# Patient Record
Sex: FEMALE | Race: WHITE
[De-identification: names, ages, dates, MRNs, and addresses within clinical notes are randomized per-mention and may not be internally consistent; named-entity substitution may affect disease eponyms.]

---

## 2021-05-10 ENCOUNTER — HOSPITAL ENCOUNTER (EMERGENCY)
Dept: HOSPITAL 8 - ED | Age: 46
Discharge: HOME | End: 2021-05-10
Payer: MEDICAID

## 2021-05-10 VITALS — HEIGHT: 62 IN | WEIGHT: 136.69 LBS | BODY MASS INDEX: 25.15 KG/M2

## 2021-05-10 VITALS — SYSTOLIC BLOOD PRESSURE: 162 MMHG | DIASTOLIC BLOOD PRESSURE: 108 MMHG

## 2021-05-10 DIAGNOSIS — E11.9: ICD-10-CM

## 2021-05-10 DIAGNOSIS — I10: ICD-10-CM

## 2021-05-10 DIAGNOSIS — R07.89: Primary | ICD-10-CM

## 2021-05-10 DIAGNOSIS — R51.9: ICD-10-CM

## 2021-05-10 DIAGNOSIS — F15.10: ICD-10-CM

## 2021-05-10 DIAGNOSIS — Z98.61: ICD-10-CM

## 2021-05-10 DIAGNOSIS — R00.2: ICD-10-CM

## 2021-05-10 LAB
ALBUMIN SERPL-MCNC: 3.7 G/DL (ref 3.4–5)
ANION GAP SERPL CALC-SCNC: 6 MMOL/L (ref 5–15)
BASOPHILS # BLD AUTO: 0.1 X10^3/UL (ref 0–0.1)
BASOPHILS NFR BLD AUTO: 1 % (ref 0–1)
CALCIUM SERPL-MCNC: 9.2 MG/DL (ref 8.5–10.1)
CHLORIDE SERPL-SCNC: 102 MMOL/L (ref 98–107)
CREAT SERPL-MCNC: 0.7 MG/DL (ref 0.55–1.02)
EOSINOPHIL # BLD AUTO: 0.1 X10^3/UL (ref 0–0.4)
EOSINOPHIL NFR BLD AUTO: 2 % (ref 1–7)
ERYTHROCYTE [DISTWIDTH] IN BLOOD BY AUTOMATED COUNT: 13.2 % (ref 9.6–15.2)
LYMPHOCYTES # BLD AUTO: 2.7 X10^3/UL (ref 1–3.4)
LYMPHOCYTES NFR BLD AUTO: 40 % (ref 22–44)
MCH RBC QN AUTO: 27.9 PG (ref 27–34.8)
MCHC RBC AUTO-ENTMCNC: 33.6 G/DL (ref 32.4–35.8)
MD: NO
MONOCYTES # BLD AUTO: 0.5 X10^3/UL (ref 0.2–0.8)
MONOCYTES NFR BLD AUTO: 8 % (ref 2–9)
NEUTROPHILS # BLD AUTO: 3.3 X10^3/UL (ref 1.8–6.8)
NEUTROPHILS NFR BLD AUTO: 49 % (ref 42–75)
PLATELET # BLD AUTO: 216 X10^3/UL (ref 130–400)
PMV BLD AUTO: 8.2 FL (ref 7.4–10.4)
RBC # BLD AUTO: 4.44 X10^6/UL (ref 3.82–5.3)

## 2021-05-10 PROCEDURE — 99284 EMERGENCY DEPT VISIT MOD MDM: CPT

## 2021-05-10 PROCEDURE — 85025 COMPLETE CBC W/AUTO DIFF WBC: CPT

## 2021-05-10 PROCEDURE — 80048 BASIC METABOLIC PNL TOTAL CA: CPT

## 2021-05-10 PROCEDURE — 93005 ELECTROCARDIOGRAM TRACING: CPT

## 2021-05-10 PROCEDURE — 82040 ASSAY OF SERUM ALBUMIN: CPT

## 2021-05-10 PROCEDURE — 36415 COLL VENOUS BLD VENIPUNCTURE: CPT

## 2021-05-10 PROCEDURE — 84443 ASSAY THYROID STIM HORMONE: CPT

## 2021-05-10 NOTE — NUR
PT STATES REFUSING CXR BECAUSE SHE DOESN'T THINK THAT SHE NEEDS IT. PT STATES 
"I THINK THIS IS BECAUSE OF MY ANXIETY, I DON'T THINK IM HAVING A HEART 
ATTACK". PT EDUCATED ON ED PROCESS AND ERP RECOMMENDATIONS. PT AGREED TO STAY 
FOR LAB RESULTS BUT THEN WOULD LIKE TO LEAVE BECAUSE SHE IS WORRIED SHE WILL 
LOOSE HER BED.

## 2021-05-10 NOTE — NUR
REPORT RECIEVED FROM NAKUL DAIGLE. PT ANXIOUS ABOUT WANTING TO LEAVE, DR. FISHER 
SPOKE WITH PATIENT ABOUT WAITING UNTIL TROPS COME BACK. PT UNDERSTANDING AT 
THIS TIME

## 2021-05-10 NOTE — NUR
TELEPHONE CALL TO LAB REGARDING TROPONIN DELAY.  STATES THAT THEY HAD 
TO RUN TROP AGAIN AND IT SHOULD BE RESULTED IN 5-10 MINUTES.

## 2021-05-10 NOTE — NUR
PT OFFERED TAXI VOUCHER TO [facility] AND DECLINED. STATES SHE WANTS TO WALK. PT 
DISCONNECTED FROM MONITORING AND CHANGED. TROPONIN STILL PENDING. REPORT GIVEN 
TO KEEGAN MOTA. RESP EVEN AND UNLABORED, CHRIS.

## 2021-05-10 NOTE — NUR
PT AMBULATED TO THE BR W/ A STEADY GAIT, WHEN ASKED TO PROVIDE URINE SAMPLE PT 
STATES "THEY SAID I DIDNT NEED ONE, THIS IS WHAT MAKES ME UNCOMFORTABLE, DON'T 
THEY ALWAYS ASK FOR A URINE SAMPLE? I'M GETTING BAD VIBES".

## 2021-05-10 NOTE — NUR
THIS IS A 44 YO F BIB EMS FROM [facility] W/ C/O CHEST PRESSURE AND HA THAT 
STARTED WHILE WALKING AT APPROX 1530. PT ALSO CONCERNED W/ BP, REPORTS TAKING 
LISINOPRIL LAST NIGHT. PT EXTREMELY ANXIOUS DURING INITIAL ENCOUNTER, STATES "I 
FEEL LIKE YOU'RE TRYING TO TAKE MY THINGS AWAY, THE LAST HOSPITAL I WAS AT 
STOLE MY PHONE AND I FEEL LIKE THIS IS LIKE THAT. ARE YOU GOING TO GIVE ME 
MEDICATIONS W/O MY CONSENT AND BRUSH ME OFF LIKE THE LAST PLACE?". PT EDUCATED 
ON ED PROCESS. CONNECTED TO MONITORING, RESP EVEN AND UNLABORED, BRI

## 2021-09-17 ENCOUNTER — HOSPITAL ENCOUNTER (EMERGENCY)
Dept: HOSPITAL 8 - ED | Age: 46
Discharge: HOME | End: 2021-09-17
Payer: MEDICAID

## 2021-09-17 VITALS — DIASTOLIC BLOOD PRESSURE: 77 MMHG | SYSTOLIC BLOOD PRESSURE: 142 MMHG

## 2021-09-17 VITALS — HEIGHT: 62 IN | WEIGHT: 132.5 LBS | BODY MASS INDEX: 24.38 KG/M2

## 2021-09-17 DIAGNOSIS — U07.1: Primary | ICD-10-CM

## 2021-09-17 DIAGNOSIS — J06.9: ICD-10-CM

## 2021-09-17 DIAGNOSIS — B97.89: ICD-10-CM

## 2021-09-17 PROCEDURE — U0003 INFECTIOUS AGENT DETECTION BY NUCLEIC ACID (DNA OR RNA); SEVERE ACUTE RESPIRATORY SYNDROME CORONAVIRUS 2 (SARS-COV-2) (CORONAVIRUS DISEASE [COVID-19]), AMPLIFIED PROBE TECHNIQUE, MAKING USE OF HIGH THROUGHPUT TECHNOLOGIES AS DESCRIBED BY CMS-2020-01-R: HCPCS

## 2021-09-17 PROCEDURE — 71045 X-RAY EXAM CHEST 1 VIEW: CPT

## 2021-09-17 PROCEDURE — 99284 EMERGENCY DEPT VISIT MOD MDM: CPT

## 2021-09-17 NOTE — NUR
DISCAHRGED FROM [employer] AFTER WATCHING FOR 15 MIN POST APAP/TORADOL ADMIN 

REVIEWED COVID QUARANTINE BASICS-TEACH BACK SUCCESSFUL